# Patient Record
Sex: FEMALE | Race: WHITE | URBAN - METROPOLITAN AREA
[De-identification: names, ages, dates, MRNs, and addresses within clinical notes are randomized per-mention and may not be internally consistent; named-entity substitution may affect disease eponyms.]

---

## 2017-12-26 ENCOUNTER — IMPORTED ENCOUNTER (OUTPATIENT)
Dept: URBAN - METROPOLITAN AREA CLINIC 50 | Facility: CLINIC | Age: 82
End: 2017-12-26

## 2018-01-10 ENCOUNTER — IMPORTED ENCOUNTER (OUTPATIENT)
Dept: URBAN - METROPOLITAN AREA CLINIC 50 | Facility: CLINIC | Age: 83
End: 2018-01-10

## 2018-01-26 ENCOUNTER — IMPORTED ENCOUNTER (OUTPATIENT)
Dept: URBAN - METROPOLITAN AREA CLINIC 50 | Facility: CLINIC | Age: 83
End: 2018-01-26

## 2018-02-15 ENCOUNTER — IMPORTED ENCOUNTER (OUTPATIENT)
Dept: URBAN - METROPOLITAN AREA CLINIC 50 | Facility: CLINIC | Age: 83
End: 2018-02-15

## 2018-02-16 ENCOUNTER — IMPORTED ENCOUNTER (OUTPATIENT)
Dept: URBAN - METROPOLITAN AREA CLINIC 50 | Facility: CLINIC | Age: 83
End: 2018-02-16

## 2018-02-21 ENCOUNTER — IMPORTED ENCOUNTER (OUTPATIENT)
Dept: URBAN - METROPOLITAN AREA CLINIC 50 | Facility: CLINIC | Age: 83
End: 2018-02-21

## 2018-02-21 NOTE — PATIENT DISCUSSION
"""S/P IOL OD: Sensar AAB00 17.0 +TM. Continue post operative instructions and drops per schedule.  """

## 2018-03-02 ENCOUNTER — IMPORTED ENCOUNTER (OUTPATIENT)
Dept: URBAN - METROPOLITAN AREA CLINIC 50 | Facility: CLINIC | Age: 83
End: 2018-03-02

## 2018-03-07 ENCOUNTER — IMPORTED ENCOUNTER (OUTPATIENT)
Dept: URBAN - METROPOLITAN AREA CLINIC 50 | Facility: CLINIC | Age: 83
End: 2018-03-07

## 2018-03-07 NOTE — PATIENT DISCUSSION
"""S/P IOL OS: Sensar AAB00 14.0 +TM. Continue post operative instructions and drops per schedule.  """

## 2018-03-16 ENCOUNTER — IMPORTED ENCOUNTER (OUTPATIENT)
Dept: URBAN - METROPOLITAN AREA CLINIC 50 | Facility: CLINIC | Age: 83
End: 2018-03-16

## 2018-04-06 ENCOUNTER — IMPORTED ENCOUNTER (OUTPATIENT)
Dept: URBAN - METROPOLITAN AREA CLINIC 50 | Facility: CLINIC | Age: 83
End: 2018-04-06

## 2018-07-11 ENCOUNTER — IMPORTED ENCOUNTER (OUTPATIENT)
Dept: URBAN - METROPOLITAN AREA CLINIC 50 | Facility: CLINIC | Age: 83
End: 2018-07-11

## 2019-10-23 ENCOUNTER — IMPORTED ENCOUNTER (OUTPATIENT)
Dept: URBAN - METROPOLITAN AREA CLINIC 50 | Facility: CLINIC | Age: 84
End: 2019-10-23

## 2020-11-04 ENCOUNTER — IMPORTED ENCOUNTER (OUTPATIENT)
Dept: URBAN - METROPOLITAN AREA CLINIC 50 | Facility: CLINIC | Age: 85
End: 2020-11-04

## 2021-04-18 ASSESSMENT — VISUAL ACUITY
OD_PH: 20/25-
OD_CC: J3@ 14 IN
OD_SC: 20/30
OD_SC: 20/40
OS_SC: 20/400
OS_OTHER: 20/400. 20/400.
OD_BAT: 20/400
OS_CC: J2-
OD_BAT: 20/80
OS_OTHER: 20/40. 20/40.
OS_CC: J7@ 18 IN
OS_SC: 20/30
OD_PH: 20/50+1
OS_SC: 20/30-1
OS_OTHER: 20/400.
OS_PH: 20/80-2
OD_CC: J2-
OS_OTHER: 20/60. 20/70.
OS_PH: 20/25-2
OS_CC: 20/200
OS_BAT: 20/70
OD_CC: 20/70-2
OS_CC: J2@ 16 IN
OD_PH: 20/25
OD_SC: 20/30
OD_CC: J1+
OD_CC: 20/25-2
OS_SC: 20/30-2
OS_CC: J3@ 14 IN
OS_CC: 20/400
OS_CC: 20/30+2
OD_OTHER: 20/80. 20/100.
OD_OTHER: 20/400.
OD_OTHER: 20/40. 20/60.
OS_BAT: 20/400
OD_SC: 20/30
OD_OTHER: 20/400.
OD_BAT: 20/400
OS_CC: J1+
OD_SC: 20/25-
OS_OTHER: 20/70. 20/200.
OS_BAT: 20/40
OD_BAT: 20/30-1
OD_OTHER: 20/30-1. 20/40.
OS_BAT: 20/400
OS_CC: 20/400
OS_SC: 20/30
OS_PH: 20/400
OS_PH: 20/30
OD_BAT: 20/40
OD_CC: J7@ 18 IN
OS_PH: 20/25-
OD_CC: J2@ 16 IN
OD_SC: 20/25-2
OS_BAT: 20/60
OS_OTHER: 20/400. 20/400.
OD_CC: 20/60
OS_BAT: 20/400

## 2021-04-18 ASSESSMENT — PACHYMETRY
OS_CT_UM: 608
OD_CT_UM: 609
OS_CT_UM: 608
OS_CT_UM: 608
OD_CT_UM: 609
OS_CT_UM: 608
OD_CT_UM: 609
OS_CT_UM: 608
OD_CT_UM: 609
OD_CT_UM: 609
OS_CT_UM: 608
OS_CT_UM: 608

## 2021-04-18 ASSESSMENT — TONOMETRY
OD_IOP_MMHG: 17
OS_IOP_MMHG: 20
OS_IOP_MMHG: 13
OD_IOP_MMHG: 17
OD_IOP_MMHG: 16
OS_IOP_MMHG: 17
OS_IOP_MMHG: 20
OS_IOP_MMHG: 22
OD_IOP_MMHG: 14
OS_IOP_MMHG: 18
OD_IOP_MMHG: 31
OD_IOP_MMHG: 28
OD_IOP_MMHG: 18
OD_IOP_MMHG: 15
OD_IOP_MMHG: 21
OS_IOP_MMHG: 16
OS_IOP_MMHG: 20
OS_IOP_MMHG: 16
OS_IOP_MMHG: 13
OS_IOP_MMHG: 17
OD_IOP_MMHG: 20
OS_IOP_MMHG: 19
OS_IOP_MMHG: 20
OD_IOP_MMHG: 20
OD_IOP_MMHG: 23
OD_IOP_MMHG: 13
OS_IOP_MMHG: 17
OS_IOP_MMHG: 17
OS_IOP_MMHG: 15
OS_IOP_MMHG: 23
OD_IOP_MMHG: 17
OD_IOP_MMHG: 17
OD_IOP_MMHG: 20
OD_IOP_MMHG: 18

## 2021-11-02 NOTE — PATIENT DISCUSSION
No significant drooping of either upper eyelid at this time.  Expressed that any surgery would be considered cosmetic at this time.  Patient expressed understanding and wishes to forgo surgery at this time.

## 2021-12-31 ENCOUNTER — PREPPED CHART (OUTPATIENT)
Dept: URBAN - METROPOLITAN AREA CLINIC 48 | Facility: CLINIC | Age: 86
End: 2021-12-31

## 2021-12-31 NOTE — PATIENT DISCUSSION
"""Informed patient that their capsular opacification is visually significant and meets the minimum criteria for capsulotomy by YAG laser to increase their vision and decrease their glare symptoms. RBAs of procedure discussed.  ""."

## 2022-02-23 ENCOUNTER — COMPREHENSIVE EXAM (OUTPATIENT)
Dept: URBAN - METROPOLITAN AREA CLINIC 48 | Facility: CLINIC | Age: 87
End: 2022-02-23

## 2022-02-23 DIAGNOSIS — H43.813: ICD-10-CM

## 2022-02-23 DIAGNOSIS — H26.493: ICD-10-CM

## 2022-02-23 DIAGNOSIS — H04.123: ICD-10-CM

## 2022-02-23 DIAGNOSIS — H35.3131: ICD-10-CM

## 2022-02-23 PROCEDURE — 92014 COMPRE OPH EXAM EST PT 1/>: CPT

## 2022-02-23 PROCEDURE — 92134 CPTRZ OPH DX IMG PST SGM RTA: CPT

## 2022-02-23 ASSESSMENT — VISUAL ACUITY
OD_SC: 20/30-2
OS_SC: 20/40
OD_GLARE: 20/60
OS_GLARE: 20/40
OD_GLARE: 20/50
OS_GLARE: 20/60
OU_SC: J2

## 2022-02-23 ASSESSMENT — TONOMETRY
OS_IOP_MMHG: 17
OD_IOP_MMHG: 17
OS_IOP_MMHG: 14
OD_IOP_MMHG: 14

## 2022-02-23 NOTE — PATIENT DISCUSSION
PCO ([id number] [place] [id number]): Visually significant PCO present on exam today. Recommend YAG laser capsulotomy to improve vision and decrease glare symptoms. RBAs of procedure discussed. Patient agrees and wishes to proceed.